# Patient Record
Sex: MALE | Race: WHITE | NOT HISPANIC OR LATINO | Employment: FULL TIME | URBAN - METROPOLITAN AREA
[De-identification: names, ages, dates, MRNs, and addresses within clinical notes are randomized per-mention and may not be internally consistent; named-entity substitution may affect disease eponyms.]

---

## 2023-03-05 PROBLEM — Z82.49 FAMILY HISTORY OF CARDIOVASCULAR DISEASE: Status: ACTIVE | Noted: 2023-03-05

## 2023-03-06 NOTE — PROGRESS NOTES
"Chief Complaint  Establish Care, Hypertension, and Hyperlipidemia (Family hx of cardiac related deaths)      History of Present Illness  Amauri Lewis presents to [facility]  Patient is a 42-year-old with a history of hypertension dyslipidemia and strong family history for CAD he is here for evaluation and screening.  He reports symptomatically doing well with no anginal chest discomfort or shortness of breath issues.  He does not exercise regularly but is very active at work usually getting over 13,000 steps a day with no changes exercise capacity.  Denies any PND orthopnea lower extremity no any syncope or presyncopal episodes    Past Medical History:   Diagnosis Date   • Hyperlipidemia    • Hypertension          Current Outpatient Medications:   •  atorvastatin (LIPITOR) 40 MG tablet, , Disp: , Rfl:   •  chlorthalidone (HYGROTON) 25 MG tablet, , Disp: , Rfl:   •  metoprolol succinate XL (TOPROL-XL) 50 MG 24 hr tablet, Take 1 tablet by mouth Every Morning., Disp: , Rfl:     There are no discontinued medications.  No Known Allergies     Social History     Tobacco Use   • Smoking status: Never   • Smokeless tobacco: Never   Vaping Use   • Vaping Use: Never used   Substance Use Topics   • Alcohol use: Never   • Drug use: Never       Family History   Problem Relation Age of Onset   • Cardiomyopathy Father    • Heart disease Brother    • Heart attack Paternal Uncle    • Heart block Maternal Grandmother    • Arrhythmia Paternal Grandfather         Objective     BP (!) 141/102   Pulse 62   Ht 157.5 cm (62\")   Wt 119 kg (262 lb)   BMI 47.92 kg/m²       Physical Exam    General Appearance:   · no acute distress  · Alert and oriented x3  HENT:   · lips not cyanotic  · Atraumatic  Neck:  · No jvd   · supple  Respiratory:  · no respiratory distress  · normal breath sounds  · no rales  Cardiovascular:  · Regular rate and rhythm  · no S3, no S4   · no murmur  · no rub  Extremities  · No " cyanosis  · lower extremity edema: none    Skin:   · warm, dry  · No rashes    Result Review :     No results found for: PROBNP                                   No results found for: TSH   No results found for: FREET4   No results found for: DDIMERQUANT  No results found for: MG   No results found for: DIGOXIN   No results found for: TROPONINT   No results found for: POCTROP(                    ECG 12 Lead    Date/Time: 3/13/2023 11:05 AM  Performed by: Kevin Perry MD  Authorized by: Kevin Perry MD   Comparison: compared with previous ECG   Similar to previous ECG  Rhythm: sinus arrhythmia           No results found for this or any previous visit.             The ASCVD Risk score (Jim DK, et al., 2019) failed to calculate for the following reasons:    Cannot find a previous HDL lab    Cannot find a previous total cholesterol lab     Diagnoses and all orders for this visit:    1. Family history of cardiovascular disease (Primary)  Assessment & Plan:  Patient with strong family history for CAD no ongoing anginal symptoms would not recommend routine screening with stress testing.  Did discuss the pros and cons of a calcium scoring this were primarily be to help intensified preventative strategies.  Given his family history agreed upon taking prophylactic aspirin 81 mg once a day as well as working on further titration of his statin treatment to get LDL less than 70.  Since patient is willing to intensify therapy for preventative purposes do not see clinical benefit from doing calcium scoring I would recommend these interventions as well as aerobic exercise program      2. Essential hypertension  Assessment & Plan:  Elevated in office today recommended keeping a home log would advocate for a aggressive blood pressure lowering goal in the 130s or 80s or less      3. Dyslipidemia  Assessment & Plan:  Given his family history recommend aggressive titration of his LDL goal targeting less than 70 patient was  agreeable to this but was going to discuss with his PCP about further medication changes.      Other orders  -     ECG 12 Lead          Follow Up     No follow-ups on file.          Patient was given instructions and counseling regarding his condition or for health maintenance advice. Please see specific information pulled into the AVS if appropriate.

## 2023-03-13 ENCOUNTER — OFFICE VISIT (OUTPATIENT)
Dept: CARDIOLOGY | Facility: CLINIC | Age: 43
End: 2023-03-13
Payer: COMMERCIAL

## 2023-03-13 VITALS
BODY MASS INDEX: 48.21 KG/M2 | WEIGHT: 262 LBS | SYSTOLIC BLOOD PRESSURE: 141 MMHG | HEART RATE: 62 BPM | DIASTOLIC BLOOD PRESSURE: 102 MMHG | HEIGHT: 62 IN

## 2023-03-13 DIAGNOSIS — E78.5 DYSLIPIDEMIA: ICD-10-CM

## 2023-03-13 DIAGNOSIS — I10 ESSENTIAL HYPERTENSION: ICD-10-CM

## 2023-03-13 DIAGNOSIS — Z82.49 FAMILY HISTORY OF CARDIOVASCULAR DISEASE: Primary | ICD-10-CM

## 2023-03-13 PROCEDURE — 93000 ELECTROCARDIOGRAM COMPLETE: CPT | Performed by: INTERNAL MEDICINE

## 2023-03-13 PROCEDURE — 99204 OFFICE O/P NEW MOD 45 MIN: CPT | Performed by: INTERNAL MEDICINE

## 2023-03-13 RX ORDER — METOPROLOL SUCCINATE 50 MG/1
50 TABLET, EXTENDED RELEASE ORAL EVERY MORNING
COMMUNITY
Start: 2023-01-10

## 2023-03-13 RX ORDER — ATORVASTATIN CALCIUM 40 MG/1
TABLET, FILM COATED ORAL
COMMUNITY
Start: 2023-01-10

## 2023-03-13 RX ORDER — CHLORTHALIDONE 25 MG/1
TABLET ORAL
COMMUNITY
Start: 2023-02-18

## 2023-03-13 NOTE — ASSESSMENT & PLAN NOTE
Patient with strong family history for CAD no ongoing anginal symptoms would not recommend routine screening with stress testing.  Did discuss the pros and cons of a calcium scoring this were primarily be to help intensified preventative strategies.  Given his family history agreed upon taking prophylactic aspirin 81 mg once a day as well as working on further titration of his statin treatment to get LDL less than 70.  Since patient is willing to intensify therapy for preventative purposes do not see clinical benefit from doing calcium scoring I would recommend these interventions as well as aerobic exercise program

## 2023-03-13 NOTE — ASSESSMENT & PLAN NOTE
Given his family history recommend aggressive titration of his LDL goal targeting less than 70 patient was agreeable to this but was going to discuss with his PCP about further medication changes.

## 2023-03-13 NOTE — ASSESSMENT & PLAN NOTE
Elevated in office today recommended keeping a home log would advocate for a aggressive blood pressure lowering goal in the 130s or 80s or less